# Patient Record
Sex: MALE | URBAN - METROPOLITAN AREA
[De-identification: names, ages, dates, MRNs, and addresses within clinical notes are randomized per-mention and may not be internally consistent; named-entity substitution may affect disease eponyms.]

---

## 2021-11-09 ENCOUNTER — IMPORTED ENCOUNTER (OUTPATIENT)
Dept: URBAN - METROPOLITAN AREA CLINIC 31 | Facility: CLINIC | Age: 76
End: 2021-11-09

## 2021-11-09 PROBLEM — H25.13: Noted: 2021-11-09

## 2021-11-09 PROCEDURE — 92015 DETERMINE REFRACTIVE STATE: CPT

## 2021-11-09 PROCEDURE — 92004 COMPRE OPH EXAM NEW PT 1/>: CPT

## 2021-11-09 NOTE — PATIENT DISCUSSION
1.  Nuclear Sclerotic Cataract OU: Explained how cataracts can effect vision. Recommend clinical observation. The patient was advised to contact us if any change or worsening of vision. 2. PT is happy with no gls dist and near--Pt is doing well and not reading much due to Dementia--3. Pt using 1.50 occ and can get stronger rx like 2.00 if wants4. Pt will start wearing suns more--Dad had AMD5.   RTN 1 yr CE  Dx Dementia

## 2022-04-01 ASSESSMENT — VISUAL ACUITY
OS_SC: J3+2
OD_SC: J3+2
OS_CC: 20/30
OD_CC: 20/30+2

## 2022-04-01 ASSESSMENT — TONOMETRY
OD_IOP_MMHG: 15
OS_IOP_MMHG: 16